# Patient Record
Sex: MALE
[De-identification: names, ages, dates, MRNs, and addresses within clinical notes are randomized per-mention and may not be internally consistent; named-entity substitution may affect disease eponyms.]

---

## 2021-12-27 ENCOUNTER — NURSE TRIAGE (OUTPATIENT)
Dept: OTHER | Facility: CLINIC | Age: 41
End: 2021-12-27

## 2021-12-27 NOTE — TELEPHONE ENCOUNTER
Subjective: Caller states \"My son got really sick during the night, he was throwing up dark yellow and it had a very strong odor. Could he have food poisoning. \"     Current Symptoms: vomiting, abd. Pain, sweaty    Onset: 1 day ago; sudden    Associated Symptoms: diarrhea    Pain Severity: patient not available/10; N/A; none    Temperature: 95.5 by forehead thermometer    What has been tried: nothing    LMP: NA Pregnant: NA    Recommended disposition: the ED or PCP triage)    Limited triage due to patient not present    Care advice provided, patient verbalizes understanding; denies any other questions or concerns; instructed to call back for any new or worsening symptoms. Patient/caller to follow-up with PCP     This triage is a result of a call to [address] [place] [address]. Please do not respond to the triage nurse through this encounter. Any subsequent communication should be directly with the patient.         Reason for Disposition   [1] SEVERE vomiting (e.g., 6 or more times/day) AND [2] present > 8 hours (Exception: patient sounds well, is drinking liquids, does not sound dehydrated, and vomiting has lasted less than 24 hours)    Protocols used: VOMITING-ADULT-